# Patient Record
Sex: MALE | Race: BLACK OR AFRICAN AMERICAN | NOT HISPANIC OR LATINO
[De-identification: names, ages, dates, MRNs, and addresses within clinical notes are randomized per-mention and may not be internally consistent; named-entity substitution may affect disease eponyms.]

---

## 2017-01-05 ENCOUNTER — APPOINTMENT (OUTPATIENT)
Dept: PULMONOLOGY | Facility: CLINIC | Age: 72
End: 2017-01-05

## 2017-01-05 VITALS
HEIGHT: 66 IN | WEIGHT: 157 LBS | OXYGEN SATURATION: 99 % | HEART RATE: 68 BPM | SYSTOLIC BLOOD PRESSURE: 140 MMHG | DIASTOLIC BLOOD PRESSURE: 90 MMHG | BODY MASS INDEX: 25.23 KG/M2

## 2017-01-05 DIAGNOSIS — Z85.51 PERSONAL HISTORY OF MALIGNANT NEOPLASM OF BLADDER: ICD-10-CM

## 2017-01-05 DIAGNOSIS — Z87.891 PERSONAL HISTORY OF NICOTINE DEPENDENCE: ICD-10-CM

## 2017-01-05 PROBLEM — Z00.00 ENCOUNTER FOR PREVENTIVE HEALTH EXAMINATION: Status: ACTIVE | Noted: 2017-01-05

## 2017-01-06 PROBLEM — Z87.891 FORMER SMOKER: Status: ACTIVE | Noted: 2017-01-06

## 2017-01-06 PROBLEM — Z85.51 HISTORY OF MALIGNANT NEOPLASM OF BLADDER: Status: RESOLVED | Noted: 2017-01-06 | Resolved: 2017-01-06

## 2017-01-17 ENCOUNTER — RECORD ABSTRACTING (OUTPATIENT)
Age: 72
End: 2017-01-17

## 2017-01-17 DIAGNOSIS — C67.9 MALIGNANT NEOPLASM OF BLADDER, UNSPECIFIED: ICD-10-CM

## 2017-01-17 DIAGNOSIS — R07.9 CHEST PAIN, UNSPECIFIED: ICD-10-CM

## 2017-01-17 DIAGNOSIS — E11.9 TYPE 2 DIABETES MELLITUS W/OUT COMPLICATIONS: ICD-10-CM

## 2017-01-20 ENCOUNTER — APPOINTMENT (OUTPATIENT)
Dept: PULMONOLOGY | Facility: CLINIC | Age: 72
End: 2017-01-20

## 2017-01-20 VITALS
HEIGHT: 66 IN | SYSTOLIC BLOOD PRESSURE: 150 MMHG | BODY MASS INDEX: 25.71 KG/M2 | WEIGHT: 160 LBS | DIASTOLIC BLOOD PRESSURE: 70 MMHG | HEART RATE: 69 BPM | OXYGEN SATURATION: 100 %

## 2017-02-03 ENCOUNTER — APPOINTMENT (OUTPATIENT)
Dept: PULMONOLOGY | Facility: CLINIC | Age: 72
End: 2017-02-03

## 2017-02-03 DIAGNOSIS — R04.2 HEMOPTYSIS: ICD-10-CM

## 2017-02-17 ENCOUNTER — APPOINTMENT (OUTPATIENT)
Dept: PULMONOLOGY | Facility: CLINIC | Age: 72
End: 2017-02-17

## 2017-02-17 VITALS
HEART RATE: 86 BPM | BODY MASS INDEX: 24.91 KG/M2 | SYSTOLIC BLOOD PRESSURE: 146 MMHG | WEIGHT: 155 LBS | HEIGHT: 66 IN | OXYGEN SATURATION: 99 % | DIASTOLIC BLOOD PRESSURE: 76 MMHG

## 2017-03-17 ENCOUNTER — APPOINTMENT (OUTPATIENT)
Dept: PULMONOLOGY | Facility: CLINIC | Age: 72
End: 2017-03-17

## 2017-03-17 VITALS
TEMPERATURE: 98.5 F | WEIGHT: 156 LBS | OXYGEN SATURATION: 100 % | DIASTOLIC BLOOD PRESSURE: 78 MMHG | HEIGHT: 66 IN | SYSTOLIC BLOOD PRESSURE: 134 MMHG | HEART RATE: 85 BPM | BODY MASS INDEX: 25.07 KG/M2

## 2017-04-28 ENCOUNTER — APPOINTMENT (OUTPATIENT)
Dept: PULMONOLOGY | Facility: CLINIC | Age: 72
End: 2017-04-28

## 2017-04-28 VITALS
DIASTOLIC BLOOD PRESSURE: 78 MMHG | BODY MASS INDEX: 24.59 KG/M2 | WEIGHT: 153 LBS | HEART RATE: 73 BPM | SYSTOLIC BLOOD PRESSURE: 120 MMHG | OXYGEN SATURATION: 100 % | HEIGHT: 66 IN

## 2017-04-28 DIAGNOSIS — R05 COUGH: ICD-10-CM

## 2017-05-31 ENCOUNTER — APPOINTMENT (OUTPATIENT)
Dept: PULMONOLOGY | Facility: CLINIC | Age: 72
End: 2017-05-31

## 2017-05-31 VITALS
SYSTOLIC BLOOD PRESSURE: 126 MMHG | RESPIRATION RATE: 17 BRPM | WEIGHT: 148 LBS | OXYGEN SATURATION: 100 % | DIASTOLIC BLOOD PRESSURE: 76 MMHG | HEIGHT: 66 IN | HEART RATE: 78 BPM | BODY MASS INDEX: 23.78 KG/M2

## 2017-09-08 ENCOUNTER — APPOINTMENT (OUTPATIENT)
Dept: PULMONOLOGY | Facility: CLINIC | Age: 72
End: 2017-09-08
Payer: MEDICARE

## 2017-09-08 VITALS
RESPIRATION RATE: 16 BRPM | HEART RATE: 76 BPM | SYSTOLIC BLOOD PRESSURE: 124 MMHG | TEMPERATURE: 97.4 F | BODY MASS INDEX: 22.98 KG/M2 | WEIGHT: 143 LBS | OXYGEN SATURATION: 100 % | DIASTOLIC BLOOD PRESSURE: 74 MMHG | HEIGHT: 66 IN

## 2017-09-08 PROCEDURE — 94729 DIFFUSING CAPACITY: CPT

## 2017-09-08 PROCEDURE — 94060 EVALUATION OF WHEEZING: CPT

## 2017-09-08 PROCEDURE — 99214 OFFICE O/P EST MOD 30 MIN: CPT | Mod: 25

## 2017-09-08 PROCEDURE — 94727 GAS DIL/WSHOT DETER LNG VOL: CPT

## 2017-12-08 ENCOUNTER — APPOINTMENT (OUTPATIENT)
Dept: PULMONOLOGY | Facility: CLINIC | Age: 72
End: 2017-12-08
Payer: MEDICARE

## 2017-12-08 VITALS
WEIGHT: 141 LBS | OXYGEN SATURATION: 99 % | HEART RATE: 84 BPM | RESPIRATION RATE: 16 BRPM | SYSTOLIC BLOOD PRESSURE: 114 MMHG | DIASTOLIC BLOOD PRESSURE: 72 MMHG | BODY MASS INDEX: 22.76 KG/M2 | TEMPERATURE: 98.4 F

## 2017-12-08 PROCEDURE — 99214 OFFICE O/P EST MOD 30 MIN: CPT

## 2017-12-08 RX ORDER — AMLODIPINE BESYLATE 2.5 MG/1
2.5 TABLET ORAL
Refills: 0 | Status: DISCONTINUED | COMMUNITY
End: 2017-12-08

## 2017-12-08 RX ORDER — METOPROLOL TARTRATE 100 MG/1
100 TABLET, FILM COATED ORAL
Refills: 0 | Status: DISCONTINUED | COMMUNITY
End: 2017-12-08

## 2017-12-08 RX ORDER — METOPROLOL SUCCINATE 50 MG/1
50 TABLET, EXTENDED RELEASE ORAL
Refills: 0 | Status: ACTIVE | COMMUNITY

## 2018-03-09 ENCOUNTER — APPOINTMENT (OUTPATIENT)
Dept: PULMONOLOGY | Facility: CLINIC | Age: 73
End: 2018-03-09
Payer: MEDICARE

## 2018-03-09 VITALS
DIASTOLIC BLOOD PRESSURE: 74 MMHG | OXYGEN SATURATION: 98 % | BODY MASS INDEX: 23.4 KG/M2 | SYSTOLIC BLOOD PRESSURE: 130 MMHG | HEART RATE: 71 BPM | WEIGHT: 145 LBS

## 2018-03-09 PROCEDURE — 99214 OFFICE O/P EST MOD 30 MIN: CPT

## 2018-03-09 RX ORDER — MESALAMINE 1.2 G/1
1.2 TABLET, DELAYED RELEASE ORAL
Refills: 0 | Status: COMPLETED | COMMUNITY
End: 2018-03-09

## 2018-09-07 ENCOUNTER — APPOINTMENT (OUTPATIENT)
Dept: PULMONOLOGY | Facility: CLINIC | Age: 73
End: 2018-09-07
Payer: MEDICARE

## 2018-09-07 VITALS
TEMPERATURE: 98.1 F | BODY MASS INDEX: 23.24 KG/M2 | HEART RATE: 59 BPM | WEIGHT: 144 LBS | RESPIRATION RATE: 12 BRPM | SYSTOLIC BLOOD PRESSURE: 120 MMHG | OXYGEN SATURATION: 100 % | DIASTOLIC BLOOD PRESSURE: 80 MMHG

## 2018-09-07 PROCEDURE — 99214 OFFICE O/P EST MOD 30 MIN: CPT

## 2018-09-07 RX ORDER — ATORVASTATIN CALCIUM 20 MG/1
20 TABLET, FILM COATED ORAL
Qty: 90 | Refills: 0 | Status: DISCONTINUED | COMMUNITY
Start: 2018-01-17 | End: 2018-09-07

## 2018-09-07 RX ORDER — MESALAMINE 1.2 G/1
1.2 TABLET, DELAYED RELEASE ORAL
Refills: 0 | Status: ACTIVE | COMMUNITY

## 2018-09-07 RX ORDER — ATORVASTATIN CALCIUM 10 MG/1
10 TABLET, FILM COATED ORAL
Refills: 0 | Status: DISCONTINUED | COMMUNITY
End: 2018-09-07

## 2019-03-08 ENCOUNTER — APPOINTMENT (OUTPATIENT)
Dept: PULMONOLOGY | Facility: CLINIC | Age: 74
End: 2019-03-08
Payer: MEDICARE

## 2019-03-08 VITALS
SYSTOLIC BLOOD PRESSURE: 108 MMHG | TEMPERATURE: 97.6 F | BODY MASS INDEX: 23.57 KG/M2 | WEIGHT: 146 LBS | HEART RATE: 67 BPM | DIASTOLIC BLOOD PRESSURE: 72 MMHG | RESPIRATION RATE: 12 BRPM | OXYGEN SATURATION: 100 %

## 2019-03-08 PROCEDURE — 94060 EVALUATION OF WHEEZING: CPT

## 2019-03-08 PROCEDURE — 94727 GAS DIL/WSHOT DETER LNG VOL: CPT

## 2019-03-08 PROCEDURE — 94729 DIFFUSING CAPACITY: CPT

## 2019-03-08 PROCEDURE — 99215 OFFICE O/P EST HI 40 MIN: CPT | Mod: 25

## 2019-03-08 RX ORDER — HYDROCORTISONE 2.5% 25 MG/G
2.5 CREAM TOPICAL
Qty: 30 | Refills: 0 | Status: COMPLETED | COMMUNITY
Start: 2017-06-28 | End: 2019-03-08

## 2019-03-08 RX ORDER — ROSUVASTATIN CALCIUM 20 MG/1
20 TABLET, FILM COATED ORAL
Refills: 0 | Status: COMPLETED | COMMUNITY
End: 2019-03-08

## 2019-03-08 NOTE — DISCUSSION/SUMMARY
[FreeTextEntry1] : Darrell Bean is a 73 year-old man with history of bladder cancer and ileostomy. He is S/P a VATS with a right upper lobectomy in April 2017 for a 3.0 cm right upper lobe cavitary mass. 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 Lymph nodes negative. There was a suspicion that the right upper lobe mass was a metastatic bladder cancer. He was seen by medical oncology. Chemotherapy was advised.  \par \par Thus far there is no evidence of any recurrent disease as noted on his multiple CT scans of the chest. No further investigation is needed at this time. He is to follow up with thoracic surgery and medical oncology. \par \par I will see him again in 6 months. Sooner if needed.

## 2019-03-08 NOTE — PHYSICAL EXAM
[General Appearance - Well Developed] : well developed [Well Groomed] : well groomed [General Appearance - In No Acute Distress] : no acute distress [Neck Cervical Mass (___cm)] : no neck mass was observed [Heart Sounds] : normal S1 and S2 [Respiration, Rhythm And Depth] : normal respiratory rhythm and effort [Auscultation Breath Sounds / Voice Sounds] : lungs were clear to auscultation bilaterally [Surgical scars] : surgical scars [Abdomen Soft] : soft [Abdomen Tenderness] : non-tender [Abnormal Walk] : normal gait [Cyanosis, Localized] : no localized cyanosis [Skin Turgor] : normal skin turgor [] : no rash [No Focal Deficits] : no focal deficits [Oriented To Time, Place, And Person] : oriented to person, place, and time [Impaired Insight] : insight and judgment were intact [Erythema] : no erythema of the pharynx [FreeTextEntry1] : A urine collection bag noted on the right lower abdominal wall.

## 2019-03-08 NOTE — HISTORY OF PRESENT ILLNESS
[FreeTextEntry1] : He is feeling well. Denies any cough, wheezing shortness of breath. No chest pain or pressure. No palpitations. His appetite is normal. He is not having any constitutional symptoms. He remains active.Has not smoked since 1990.

## 2019-03-08 NOTE — REVIEW OF SYSTEMS
[Nasal Congestion] : nasal congestion [Hypertension] : ~T hypertension [Fever] : no fever [Chills] : no chills [Fatigue] : no fatigue [Poor Appetite] : normal appetite  [Epistaxis] : no nosebleeds [Postnasal Drip] : no postnasal drip [Sinus Problems] : no sinus problems [Cough] : no cough [Sputum] : not coughing up ~M sputum [Hemoptysis] : no hemoptysis [Dyspnea] : no dyspnea [Chest Tightness] : no chest tightness [Pleuritic Pain] : no pleuritic pain [Wheezing] : no wheezing [Chest Discomfort] : no chest discomfort [PND] : no PND [Palpitations] : no palpitations [Edema] : ~T edema was not present [Hives] : no urticaria was observed [Heartburn] : no heartburn [Reflux] : no reflux [Dysphagia] : no dysphagia [Nausea] : no nausea [Nocturia] : no nocturia [Myalgias] : no myalgias [Rash] : no [unfilled] rash [Headache] : no headache [Anxiety] : no anxiety [DVT] : no DVT [Pulmonary Embolism] : no pulmonary embolism [Snoring] : no snoring

## 2019-03-08 NOTE — PROCEDURE
[FreeTextEntry1] : Pulmonary function test was performed on 9/8/17.  Spirometry, lung volumes and diffusion were all normal.\par \par Pulmonary function test March 8, 2019: Spirometry was normal. Lung volumes are normal. Diffusion capacity was normal. Minimal response noted after administration of an inhaled bronchodilator.\par \par CT examination of the chest was performed on November 9, 2018 and compared to 5/18/18. A small nodule was noted in the left upper lobe, measuring 2 mm and unchanged when compared to the prior study. A ground glass nodular opacity also present in the left upper lobe measuring 5 mm. Also unchanged from the prior CT. No new nodules noted. No lymphadenopathy. No pleural effusions.

## 2019-09-11 ENCOUNTER — APPOINTMENT (OUTPATIENT)
Dept: PULMONOLOGY | Facility: CLINIC | Age: 74
End: 2019-09-11
Payer: MEDICARE

## 2019-09-11 VITALS
SYSTOLIC BLOOD PRESSURE: 120 MMHG | OXYGEN SATURATION: 100 % | HEART RATE: 61 BPM | TEMPERATURE: 97.8 F | BODY MASS INDEX: 22.27 KG/M2 | DIASTOLIC BLOOD PRESSURE: 74 MMHG | RESPIRATION RATE: 12 BRPM | WEIGHT: 138 LBS

## 2019-09-11 PROCEDURE — 99214 OFFICE O/P EST MOD 30 MIN: CPT

## 2019-09-11 NOTE — PHYSICAL EXAM
[Well Groomed] : well groomed [General Appearance - In No Acute Distress] : no acute distress [Neck Cervical Mass (___cm)] : no neck mass was observed [Heart Sounds] : normal S1 and S2 [Auscultation Breath Sounds / Voice Sounds] : lungs were clear to auscultation bilaterally [Surgical scars] : surgical scars [Abdomen Tenderness] : non-tender [Abnormal Walk] : normal gait [Cyanosis, Localized] : no localized cyanosis [Skin Turgor] : normal skin turgor [] : no rash [No Focal Deficits] : no focal deficits [Oriented To Time, Place, And Person] : oriented to person, place, and time [Impaired Insight] : insight and judgment were intact [Erythema] : no erythema of the pharynx [FreeTextEntry1] : A urine collection bag noted on the right lower abdominal wall.

## 2019-09-11 NOTE — HISTORY OF PRESENT ILLNESS
[FreeTextEntry1] : He is feeling well. Denied any cough, wheezing shortness of breath. No chest pain or pressure. No palpitations. His appetite is normal. He is not having any constitutional symptoms. He remains active.Has not smoked since 1990. Had CT of the Chest on 8/22/19.

## 2019-09-11 NOTE — REVIEW OF SYSTEMS
[Nasal Congestion] : nasal congestion [Hypertension] : ~T hypertension [Fatigue] : no fatigue [Poor Appetite] : normal appetite  [Epistaxis] : no nosebleeds [Postnasal Drip] : no postnasal drip [Cough] : no cough [Sinus Problems] : no sinus problems [Sputum] : not coughing up ~M sputum [Dyspnea] : no dyspnea [Hemoptysis] : no hemoptysis [Chest Discomfort] : no chest discomfort [PND] : no PND [Palpitations] : no palpitations [Edema] : ~T edema was not present [Hives] : no urticaria was observed [Heartburn] : no heartburn [Reflux] : no reflux [Dysphagia] : no dysphagia [Nausea] : no nausea [Nocturia] : no nocturia [Myalgias] : no myalgias [Rash] : no [unfilled] rash [Headache] : no headache [DVT] : no DVT [Anxiety] : no anxiety [Pulmonary Embolism] : no pulmonary embolism [Snoring] : no snoring

## 2019-09-11 NOTE — DISCUSSION/SUMMARY
[FreeTextEntry1] : Darrell Bean is a 74 year-old man with history of bladder cancer and ileostomy. He is S/P a VATS with a right upper lobectomy in April 2017 for a 3.0 cm right upper lobe cavitary mass. 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 Lymph nodes negative. There was a suspicion that the right upper lobe mass was a metastatic bladder cancer. He was seen by medical oncology. Chemotherapy was advised.  \par \par Based on CT of 8/22/19 there is no evidence of any recurrent disease. No further investigation is needed at this time. He is to follow up with thoracic surgery.\par \par I will see him again in 6 months. Sooner if needed.

## 2019-09-11 NOTE — PROCEDURE
[FreeTextEntry1] : Pulmonary function test was performed on 9/8/17.  Spirometry, lung volumes and diffusion were all normal.\par \par Pulmonary function test March 8, 2019: Spirometry was normal. Lung volumes are normal. Diffusion capacity was normal. Minimal response noted after administration of an inhaled bronchodilator.\par \par CT Chest 11/9/18: Compared to 5/18/18.  A small nodule was noted in the left upper lobe, measuring 2 mm and unchanged when compared to the prior study.  A ground glass nodular opacity also present in the left upper lobe measuring 5 mm. Also unchanged from the prior CT. No new nodules noted. No lymphadenopathy. No pleural effusions.\par \par CT Chest 8/22/19: Post-op changed in the right lung. 5 mm ground glass nodule in the left upper lobe unchanged compared to prior study.

## 2020-03-11 ENCOUNTER — APPOINTMENT (OUTPATIENT)
Dept: PULMONOLOGY | Facility: CLINIC | Age: 75
End: 2020-03-11
Payer: MEDICARE

## 2020-03-11 VITALS
TEMPERATURE: 98.1 F | HEART RATE: 76 BPM | DIASTOLIC BLOOD PRESSURE: 84 MMHG | OXYGEN SATURATION: 98 % | WEIGHT: 141 LBS | BODY MASS INDEX: 22.76 KG/M2 | SYSTOLIC BLOOD PRESSURE: 138 MMHG

## 2020-03-11 PROCEDURE — 99214 OFFICE O/P EST MOD 30 MIN: CPT

## 2020-03-11 NOTE — PHYSICAL EXAM
[General Appearance - In No Acute Distress] : no acute distress [Neck Cervical Mass (___cm)] : no neck mass was observed [Heart Sounds] : normal S1 and S2 [Auscultation Breath Sounds / Voice Sounds] : lungs were clear to auscultation bilaterally [Surgical scars] : surgical scars [Abdomen Tenderness] : non-tender [Abnormal Walk] : normal gait [Cyanosis, Localized] : no localized cyanosis [] : no rash [No Focal Deficits] : no focal deficits [Oriented To Time, Place, And Person] : oriented to person, place, and time [Impaired Insight] : insight and judgment were intact [Erythema] : no erythema of the pharynx [FreeTextEntry1] : A urine collection bag noted on the right lower abdominal wall.

## 2020-03-11 NOTE — REVIEW OF SYSTEMS
[Nasal Congestion] : nasal congestion [Hypertension] : ~T hypertension [Fever] : no fever [Fatigue] : no fatigue [Poor Appetite] : normal appetite  [Epistaxis] : no nosebleeds [Postnasal Drip] : no postnasal drip [Sinus Problems] : no sinus problems [Cough] : no cough [Sputum] : not coughing up ~M sputum [Hemoptysis] : no hemoptysis [Dyspnea] : no dyspnea [Chest Discomfort] : no chest discomfort [PND] : no PND [Palpitations] : no palpitations [Edema] : ~T edema was not present [Hives] : no urticaria was observed [Heartburn] : no heartburn [Reflux] : no reflux [Dysphagia] : no dysphagia [Nausea] : no nausea [Nocturia] : no nocturia [Back Pain] : ~T no back pain [Myalgias] : no myalgias [Rash] : no [unfilled] rash [Headache] : no headache [Anxiety] : no anxiety [DVT] : no DVT [Pulmonary Embolism] : no pulmonary embolism [Snoring] : no snoring

## 2020-03-11 NOTE — PROCEDURE
[FreeTextEntry1] : PFT 9/8/17:  Spirometry, lung volumes and diffusion were all normal.\par \par PFT  3/819: Spirometry was normal. Lung volumes are normal. Diffusion capacity was normal. Minimal response noted after administration of an inhaled bronchodilator.\par \par CT Chest 11/9/18: Compared to 5/18/18.  A small nodule was noted in the left upper lobe, measuring 2 mm and unchanged when compared to the prior study.  A ground glass nodular opacity also present in the left upper lobe measuring 5 mm. Also unchanged from the prior CT. No new nodules noted. No lymphadenopathy. No pleural effusions.\par \par CT Chest 8/22/19: Post-op changed in the right lung. 5 mm ground glass nodule in the left upper lobe unchanged compared to prior study.

## 2020-03-11 NOTE — HISTORY OF PRESENT ILLNESS
[FreeTextEntry1] : He is feeling well. No cough, wheezing shortness of breath. No chest pain or pressure. He is not using any inhalers. No palpitations. His appetite is normal. He is not having any constitutional symptoms. He remains active.Has not smoked since 1990.

## 2020-03-11 NOTE — DISCUSSION/SUMMARY
[FreeTextEntry1] : Darrell Bean is a 74 year-old man with history of bladder cancer and ileostomy. He is S/P a VATS with a right upper lobectomy in April 2017 for a 3.0 cm right upper lobe cavitary mass. 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 Lymph nodes were negative. There was a suspicion that the right upper lobe mass was a metastatic bladder cancer. He was seen by medical oncology. CT of 8/22/19 showed no evidence of any recurrent disease. Follow up CT in May 2020. \par \par No action needed now. \par \par I will see him again in 6 months. Sooner if needed.

## 2020-09-16 ENCOUNTER — APPOINTMENT (OUTPATIENT)
Dept: PULMONOLOGY | Facility: CLINIC | Age: 75
End: 2020-09-16
Payer: MEDICARE

## 2020-09-16 VITALS
OXYGEN SATURATION: 100 % | BODY MASS INDEX: 22.27 KG/M2 | SYSTOLIC BLOOD PRESSURE: 140 MMHG | TEMPERATURE: 98.4 F | DIASTOLIC BLOOD PRESSURE: 80 MMHG | HEART RATE: 65 BPM | WEIGHT: 138 LBS

## 2020-09-16 PROCEDURE — 99214 OFFICE O/P EST MOD 30 MIN: CPT

## 2020-09-16 NOTE — REVIEW OF SYSTEMS
[Nasal Congestion] : nasal congestion [Hypertension] : ~T hypertension [Fever] : no fever [Fatigue] : no fatigue [Epistaxis] : no nosebleeds [Poor Appetite] : normal appetite  [Sinus Problems] : no sinus problems [Postnasal Drip] : no postnasal drip [Hemoptysis] : no hemoptysis [Cough] : no cough [Sputum] : not coughing up ~M sputum [Chest Discomfort] : no chest discomfort [Dyspnea] : no dyspnea [Palpitations] : no palpitations [Edema] : ~T edema was not present [PND] : no PND [Hives] : no urticaria was observed [Heartburn] : no heartburn [Dysphagia] : no dysphagia [Reflux] : no reflux [Nocturia] : no nocturia [Nausea] : no nausea [Rash] : no [unfilled] rash [Myalgias] : no myalgias [Back Pain] : ~T no back pain [Anxiety] : no anxiety [Headache] : no headache [DVT] : no DVT [Pulmonary Embolism] : no pulmonary embolism [Snoring] : no snoring

## 2020-09-16 NOTE — HISTORY OF PRESENT ILLNESS
[Former] : former [TextBox_4] : He is feeling well. No cough, wheezing shortness of breath. No chest pain or pressure. He is not using any inhalers. No palpitations. His appetite is normal. He is not having any constitutional symptoms. He remains active.Has not smoked since 1990. [YearQuit] : 1990

## 2020-09-16 NOTE — DISCUSSION/SUMMARY
[FreeTextEntry1] : Darrell Bean is a 75 year-old man with history of bladder cancer and ileostomy. He is S/P a VATS with a right upper lobectomy in April 2017 for a 3.0 cm right upper lobe cavitary mass. 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 Lymph nodes were negative. There was a suspicion that the right upper lobe mass was a metastatic bladder cancer. He was seen by medical oncology. CT of 8/22/19 showed no evidence of any recurrent disease. Follow up CT in May 2020. \par He is doing well. \par \par Follow up CT of the Chest now. \par \par Follow up in six months. Sooner if necessary.

## 2020-09-16 NOTE — PHYSICAL EXAM
[General Appearance - In No Acute Distress] : no acute distress [Heart Sounds] : normal S1 and S2 [Neck Cervical Mass (___cm)] : no neck mass was observed [Auscultation Breath Sounds / Voice Sounds] : lungs were clear to auscultation bilaterally [Surgical scars] : surgical scars [Abdomen Tenderness] : non-tender [Abnormal Walk] : normal gait [Cyanosis, Localized] : no localized cyanosis [] : no rash [Oriented To Time, Place, And Person] : oriented to person, place, and time [No Focal Deficits] : no focal deficits [Impaired Insight] : insight and judgment were intact [Erythema] : no erythema of the pharynx [FreeTextEntry1] : A urine collection bag noted on the right lower abdominal wall.

## 2021-03-17 ENCOUNTER — APPOINTMENT (OUTPATIENT)
Dept: PULMONOLOGY | Facility: CLINIC | Age: 76
End: 2021-03-17
Payer: MEDICARE

## 2021-03-17 VITALS
SYSTOLIC BLOOD PRESSURE: 144 MMHG | TEMPERATURE: 97 F | OXYGEN SATURATION: 99 % | BODY MASS INDEX: 23.89 KG/M2 | WEIGHT: 148 LBS | HEART RATE: 70 BPM | RESPIRATION RATE: 16 BRPM | DIASTOLIC BLOOD PRESSURE: 80 MMHG

## 2021-03-17 PROCEDURE — 99213 OFFICE O/P EST LOW 20 MIN: CPT

## 2021-03-17 PROCEDURE — 99072 ADDL SUPL MATRL&STAF TM PHE: CPT

## 2021-03-17 NOTE — DISCUSSION/SUMMARY
[FreeTextEntry1] : He is a 75 year-old man with history of bladder cancer and ileostomy. He is S/P a VATS with a right upper lobectomy in April 2017 for a 3.0 cm right upper lobe cavitary mass. 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 Lymph nodes were negative. There was a suspicion that the right upper lobe mass was a metastatic bladder cancer. He was seen by medical oncology. No chemotherapy was indicated. CT of 8/22/19 showed no evidence of any recurrent disease. CT Chest 10/2/20: Postoperative changes in the right lung. 5 mm JOHNY nodule was stable. \par \par His pulmonary status is stable. No further action needed now.  \par \par Follow up with his PCP, Dr. Tate, next week. \par \par Follow up in 6 months.

## 2021-03-17 NOTE — REASON FOR VISIT
[Follow-Up] : a follow-up visit [Abnormal CT Scan] : abnormal CT Scan [Lung Cancer] : lung cancer none

## 2021-03-17 NOTE — REVIEW OF SYSTEMS
[Nasal Congestion] : nasal congestion [Hypertension] : ~T hypertension [Fever] : no fever [Fatigue] : no fatigue [Poor Appetite] : normal appetite  [Cough] : no cough [Hemoptysis] : no hemoptysis [Dyspnea] : no dyspnea [Chest Discomfort] : no chest discomfort [PND] : no PND [Palpitations] : no palpitations [Edema] : ~T edema was not present [Hives] : no urticaria was observed [Heartburn] : no heartburn [Reflux] : no reflux [Dysphagia] : no dysphagia [Nausea] : no nausea [Nocturia] : no nocturia [Back Pain] : ~T no back pain [Myalgias] : no myalgias [Rash] : no [unfilled] rash [Headache] : no headache [Anxiety] : no anxiety [DVT] : no DVT [Pulmonary Embolism] : no pulmonary embolism [Snoring] : no snoring

## 2021-03-17 NOTE — PHYSICAL EXAM
[General Appearance - In No Acute Distress] : no acute distress [Neck Cervical Mass (___cm)] : no neck mass was observed [Heart Sounds] : normal S1 and S2 [Auscultation Breath Sounds / Voice Sounds] : lungs were clear to auscultation bilaterally [Surgical scars] : surgical scars [Abdomen Tenderness] : non-tender [Abnormal Walk] : normal gait [Cyanosis, Localized] : no localized cyanosis [No Focal Deficits] : no focal deficits [Oriented To Time, Place, And Person] : oriented to person, place, and time [Impaired Insight] : insight and judgment were intact [] : no respiratory distress [FreeTextEntry1] : A urine collection bag noted on the right lower abdominal wall.

## 2021-03-17 NOTE — PROCEDURE
[FreeTextEntry1] : PFT 9/8/17:  Spirometry, lung volumes and diffusion were all normal.\par \par PFT  3/819: Spirometry was normal. Lung volumes are normal. Diffusion capacity was normal. Minimal response noted after administration of an inhaled bronchodilator.\par \par CT Chest 11/9/18: Compared to 5/18/18.  A small nodule was noted in the left upper lobe, measuring 2 mm and unchanged when compared to the prior study.  A ground glass nodular opacity also present in the left upper lobe measuring 5 mm. Also unchanged from the prior CT. No new nodules noted. No lymphadenopathy. No pleural effusions.\par \par CT Chest 8/22/19: Post-op changed in the right lung. 5 mm ground glass nodule in the left upper lobe unchanged compared to prior study. \par \par CT Chest 10/2/20: Postoperative changes in the right lung. 5 mm JOHNY nodule was stable.

## 2021-03-17 NOTE — HISTORY OF PRESENT ILLNESS
[Former] : former [TextBox_4] : He is feeling well. No cough, wheezing shortness of breath. No chest pain or pressure. \par \par He is not using any inhalers.\par \par He remains active.Has not smoked since 1990. [YearQuit] : 1990

## 2021-09-15 ENCOUNTER — APPOINTMENT (OUTPATIENT)
Dept: PULMONOLOGY | Facility: CLINIC | Age: 76
End: 2021-09-15

## 2021-10-04 ENCOUNTER — APPOINTMENT (OUTPATIENT)
Dept: PULMONOLOGY | Facility: CLINIC | Age: 76
End: 2021-10-04
Payer: MEDICARE

## 2021-10-04 VITALS
HEART RATE: 64 BPM | WEIGHT: 138 LBS | DIASTOLIC BLOOD PRESSURE: 88 MMHG | BODY MASS INDEX: 22.27 KG/M2 | SYSTOLIC BLOOD PRESSURE: 144 MMHG | TEMPERATURE: 98.4 F | OXYGEN SATURATION: 99 %

## 2021-10-04 PROCEDURE — 99213 OFFICE O/P EST LOW 20 MIN: CPT

## 2021-10-04 NOTE — REVIEW OF SYSTEMS
[Nasal Congestion] : nasal congestion [Hypertension] : ~T hypertension [Fatigue] : no fatigue [Cough] : no cough [Dyspnea] : no dyspnea [Chest Discomfort] : no chest discomfort [PND] : no PND [Palpitations] : no palpitations [Edema] : ~T edema was not present [Hives] : no urticaria was observed [Heartburn] : no heartburn [Reflux] : no reflux [Dysphagia] : no dysphagia [Nausea] : no nausea [Nocturia] : no nocturia [Back Pain] : ~T no back pain [Myalgias] : no myalgias [Rash] : no [unfilled] rash [Headache] : no headache [Anxiety] : no anxiety [DVT] : no DVT [Pulmonary Embolism] : no pulmonary embolism

## 2021-10-04 NOTE — PHYSICAL EXAM
[General Appearance - In No Acute Distress] : no acute distress [Neck Cervical Mass (___cm)] : no neck mass was observed [Heart Sounds] : normal S1 and S2 [Auscultation Breath Sounds / Voice Sounds] : lungs were clear to auscultation bilaterally [Surgical scars] : surgical scars [Abdomen Tenderness] : non-tender [Abnormal Walk] : normal gait [Cyanosis, Localized] : no localized cyanosis [No Focal Deficits] : no focal deficits [Oriented To Time, Place, And Person] : oriented to person, place, and time [Impaired Insight] : insight and judgment were intact [Skin Turgor] : normal skin turgor [FreeTextEntry1] : A urine collection bag noted on the right lower abdominal wall.

## 2021-10-04 NOTE — HISTORY OF PRESENT ILLNESS
[Former] : former [TextBox_4] : He is feeling well. No cough, wheezing shortness of breath.  \par \par He is not using any inhalers.\par \par Has not smoked since 1990. [YearQuit] : 1990 [Daytime Somnolence] : denies daytime somnolence [Difficulty Initiating Sleep] : does not have difficulty initiating sleep [Difficulty Maintaining Sleep] : does not have difficulty maintaining sleep

## 2021-10-04 NOTE — PROCEDURE
[FreeTextEntry1] : PFT 9/8/17:  Spirometry, lung volumes and diffusion were all normal.\par \par PFT  3/819: Spirometry was normal. Lung volumes are normal. Diffusion capacity was normal. Minimal response noted after administration of an inhaled bronchodilator.\par \par CT Chest 11/9/18: Compared to 5/18/18.  A small nodule was noted in the left upper lobe, measuring 2 mm and unchanged when compared to the prior study.  A ground glass nodular opacity also present in the left upper lobe measuring 5 mm. Also unchanged from the prior CT. No new nodules noted. No lymphadenopathy. No pleural effusions.\par \par CT Chest 8/22/19: Post-op changed in the right lung. 5 mm ground glass nodule in the left upper lobe unchanged compared to prior study. \par \par CT Chest 10/2/20: Postoperative changes in the right lung. 5 mm JOHNY nodule was stable. \par \par CT Chest 2/9/21: Stable post-op changes. 5 mm nodule JOHNY stable.No recurrent disease.

## 2021-10-04 NOTE — DISCUSSION/SUMMARY
[FreeTextEntry1] : 76 year-old man with history of bladder cancer and ileostomy. S/P VATS with a RUL lobectomy April 2017 for 3.0 cm right upper lobe cavitary mass. Pathology significant for carcinoma with squamous cell features. Lymph nodes negative. There was a suspicion that the right upper lobe mass was a metastatic bladder cancer. Seen by medical oncology. No chemotherapy was indicated. CT of 8/22/19 showed no evidence of any recurrent disease. CT Chest 10/2/20: Postoperative changes in the right lung. 5 mm JOHNY nodule was stable. CT Chest 2/9/21: Stable post-op changes. 5 mm nodule JOHNY stable.No recurrent disease. \par \par His pulmonary status remains stable. No further action needed now.  \par \par Follow up in 6 months. Sooner if necessary.

## 2022-04-06 ENCOUNTER — APPOINTMENT (OUTPATIENT)
Dept: PULMONOLOGY | Facility: CLINIC | Age: 77
End: 2022-04-06
Payer: MEDICARE

## 2022-04-06 VITALS
OXYGEN SATURATION: 98 % | DIASTOLIC BLOOD PRESSURE: 78 MMHG | TEMPERATURE: 97.7 F | SYSTOLIC BLOOD PRESSURE: 138 MMHG | HEART RATE: 66 BPM

## 2022-04-06 PROCEDURE — 99214 OFFICE O/P EST MOD 30 MIN: CPT

## 2022-04-06 NOTE — HISTORY OF PRESENT ILLNESS
[Former] : former [TextBox_4] : He is feeling well. Has been seen by Urology. \par \par No complaint of cough, wheezing or shortness of breath. \par \par Has not followed up with Dr. Henley. \par \par \par \par  [YearQuit] : 1990 [Daytime Somnolence] : denies daytime somnolence [Difficulty Maintaining Sleep] : does not have difficulty maintaining sleep [Fatigue] : no fatigue

## 2022-04-06 NOTE — DISCUSSION/SUMMARY
[FreeTextEntry1] : He is a 76 year-old man with history of hypertension, hyperlipidemia, bladder cancer and ileostomy. S/P VATS RUL lobectomy April 2017 for 3.0 cm right upper lobe cavitary mass. 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 Lymph nodes negative. There was a suspicion that the right upper lobe mass was a metastatic bladder cancer. Seen by medical oncology. No chemotherapy was indicated. CT 8/22/19 showed no evidence of any recurrent disease. CT Chest 10/2/20: Postoperative changes in the right lung. 5 mm JOHNY nodule was stable. CT Chest 2/9/21: Stable post-op changes. 5 mm nodule JOHNY stable.No recurrent disease. \par \par He is 5 years post op RUL lobectomy.  Last CT done in 2021. Await follow up Chest CT (has been ordered by another MD). Will re-order if necessary. To have report sent to me. \par \par Follow up in 6 months. Sooner if necessary.

## 2022-04-06 NOTE — REVIEW OF SYSTEMS
[Nasal Congestion] : nasal congestion [Hypertension] : ~T hypertension [Fever] : no fever [Fatigue] : no fatigue [Cough] : no cough [Dyspnea] : no dyspnea [Chest Discomfort] : no chest discomfort [PND] : no PND [Palpitations] : no palpitations [Edema] : ~T edema was not present [Hives] : no urticaria was observed [Heartburn] : no heartburn [Reflux] : no reflux [Dysphagia] : no dysphagia [Nausea] : no nausea [Nocturia] : no nocturia [Back Pain] : ~T no back pain [Myalgias] : no myalgias [Rash] : no [unfilled] rash [Headache] : no headache [Anxiety] : no anxiety [DVT] : no DVT [Pulmonary Embolism] : no pulmonary embolism

## 2022-04-06 NOTE — PHYSICAL EXAM
[General Appearance - In No Acute Distress] : no acute distress [Neck Cervical Mass (___cm)] : no neck mass was observed [Heart Sounds] : normal S1 and S2 [Auscultation Breath Sounds / Voice Sounds] : lungs were clear to auscultation bilaterally [Surgical scars] : surgical scars [Abnormal Walk] : normal gait [Cyanosis, Localized] : no localized cyanosis [Skin Turgor] : normal skin turgor [No Focal Deficits] : no focal deficits [Oriented To Time, Place, And Person] : oriented to person, place, and time [Neck Appearance] : the appearance of the neck was normal [] : no hepato-splenomegaly [FreeTextEntry1] : A urine collection bag noted on the right lower abdominal wall.

## 2022-10-12 ENCOUNTER — APPOINTMENT (OUTPATIENT)
Dept: PULMONOLOGY | Facility: CLINIC | Age: 77
End: 2022-10-12

## 2022-10-12 VITALS — DIASTOLIC BLOOD PRESSURE: 76 MMHG | SYSTOLIC BLOOD PRESSURE: 138 MMHG

## 2022-10-12 VITALS
DIASTOLIC BLOOD PRESSURE: 74 MMHG | SYSTOLIC BLOOD PRESSURE: 150 MMHG | TEMPERATURE: 98.4 F | OXYGEN SATURATION: 99 % | HEART RATE: 70 BPM | WEIGHT: 136 LBS | BODY MASS INDEX: 21.95 KG/M2

## 2022-10-12 DIAGNOSIS — J98.4 OTHER DISORDERS OF LUNG: ICD-10-CM

## 2022-10-12 PROCEDURE — 99214 OFFICE O/P EST MOD 30 MIN: CPT

## 2022-10-12 RX ORDER — ASPIRIN 81 MG/1
81 TABLET, CHEWABLE ORAL
Refills: 0 | Status: COMPLETED | COMMUNITY
End: 2022-10-12

## 2022-10-12 NOTE — PHYSICAL EXAM
[General Appearance - In No Acute Distress] : no acute distress [Neck Appearance] : the appearance of the neck was normal [Neck Cervical Mass (___cm)] : no neck mass was observed [Heart Sounds] : normal S1 and S2 [Auscultation Breath Sounds / Voice Sounds] : lungs were clear to auscultation bilaterally [Surgical scars] : surgical scars [] : no hepato-splenomegaly [Abnormal Walk] : normal gait [Cyanosis, Localized] : no localized cyanosis [Skin Turgor] : normal skin turgor [No Focal Deficits] : no focal deficits [Oriented To Time, Place, And Person] : oriented to person, place, and time [FreeTextEntry1] : A urine collection bag noted on the right lower abdominal wall.

## 2022-10-12 NOTE — DISCUSSION/SUMMARY
[FreeTextEntry1] : He is a 77 year-old man with history of hypertension, hyperlipidemia, bladder cancer and ileostomy. S/P VATS RUL lobectomy 4/17/17 for 3.0 cm right upper lobe cavitary mass. 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 Lymph nodes negative. There was a suspicion that the right upper lobe mass was a metastatic bladder cancer. Seen by medical oncology. No chemotherapy was indicated. CT 8/22/19 showed no evidence of any recurrent disease. CT Chest 10/2/20: Postoperative changes in the right lung. 5 mm JOHNY nodule was stable. CT Chest 2/9/21: Stable post-op changes. 5 mm nodule JONHY stable.No recurrent disease. CT Chest 7/14/22: No recurrent disease. \par \par His pulmonary status is stable.  No further intervention is needed at this time.  He was advised to follow up with his PCP, Dr. Timothy Tate. \par \par Follow up in 6 months.

## 2022-10-12 NOTE — PROCEDURE
[FreeTextEntry1] : PFT 9/8/17: Spirometry, lung volumes and diffusion were all normal.\par \par PFT 3/819: Spirometry was normal. Lung volumes are normal. Diffusion capacity was normal. Minimal response noted after administration of an inhaled bronchodilator.\par \par CT Chest 11/9/18: Compared to 5/18/18.  A small nodule was noted in the left upper lobe, measuring 2 mm and unchanged when compared to the prior study.  A ground glass nodular opacity also present in the left upper lobe measuring 5 mm. Also unchanged from the prior CT. No new nodules noted. No lymphadenopathy. No pleural effusions.\par \par CT Chest 8/22/19: Post-op changed in the right lung. 5 mm ground glass nodule in the left upper lobe unchanged compared to prior study. \par \par CT Chest 10/2/20: Postoperative changes in the right lung. 5 mm JOHNY nodule was stable. \par \par CT Chest 2/9/21: Stable post-op changes. 5 mm nodule JOHNY stable.No recurrent disease. \par \par CT Chest 7/14/22: No recurrent disease.

## 2022-10-12 NOTE — HISTORY OF PRESENT ILLNESS
[Former] : former [TextBox_4] : He is feeling well.  No complaint of cough, wheezing or shortness of breath.  No constitutional symptoms.\par \par \par  [YearQuit] : 1990 [Awakes Unrefreshed] : does not awaken unrefreshed [Daytime Somnolence] : denies daytime somnolence [Difficulty Maintaining Sleep] : does not have difficulty maintaining sleep [Fatigue] : no fatigue

## 2022-10-12 NOTE — REVIEW OF SYSTEMS
[Nasal Congestion] : nasal congestion [Hypertension] : ~T hypertension [Fatigue] : no fatigue [Cough] : no cough [Dyspnea] : no dyspnea [Wheezing] : no wheezing [Chest Discomfort] : no chest discomfort [Edema] : ~T edema was not present [Hives] : no urticaria was observed [Heartburn] : no heartburn [Reflux] : no reflux [Dysphagia] : no dysphagia [Nausea] : no nausea [Nocturia] : no nocturia [Back Pain] : ~T no back pain [Myalgias] : no myalgias [Rash] : no [unfilled] rash [Headache] : no headache [Anxiety] : no anxiety [DVT] : no DVT [Pulmonary Embolism] : no pulmonary embolism

## 2022-12-13 ENCOUNTER — OUTPATIENT (OUTPATIENT)
Dept: OUTPATIENT SERVICES | Facility: HOSPITAL | Age: 77
LOS: 1 days | End: 2022-12-13
Payer: MEDICARE

## 2022-12-13 ENCOUNTER — TRANSCRIPTION ENCOUNTER (OUTPATIENT)
Age: 77
End: 2022-12-13

## 2022-12-13 VITALS
DIASTOLIC BLOOD PRESSURE: 70 MMHG | SYSTOLIC BLOOD PRESSURE: 136 MMHG | RESPIRATION RATE: 16 BRPM | HEART RATE: 58 BPM | OXYGEN SATURATION: 98 %

## 2022-12-13 VITALS
SYSTOLIC BLOOD PRESSURE: 151 MMHG | WEIGHT: 136.03 LBS | OXYGEN SATURATION: 100 % | DIASTOLIC BLOOD PRESSURE: 77 MMHG | HEART RATE: 62 BPM | HEIGHT: 66 IN | TEMPERATURE: 99 F | RESPIRATION RATE: 16 BRPM

## 2022-12-13 DIAGNOSIS — R94.39 ABNORMAL RESULT OF OTHER CARDIOVASCULAR FUNCTION STUDY: ICD-10-CM

## 2022-12-13 LAB
ANION GAP SERPL CALC-SCNC: 12 MMOL/L — SIGNIFICANT CHANGE UP (ref 5–17)
BUN SERPL-MCNC: 12 MG/DL — SIGNIFICANT CHANGE UP (ref 7–23)
CALCIUM SERPL-MCNC: 9.1 MG/DL — SIGNIFICANT CHANGE UP (ref 8.4–10.5)
CHLORIDE SERPL-SCNC: 101 MMOL/L — SIGNIFICANT CHANGE UP (ref 96–108)
CO2 SERPL-SCNC: 26 MMOL/L — SIGNIFICANT CHANGE UP (ref 22–31)
CREAT SERPL-MCNC: 1.1 MG/DL — SIGNIFICANT CHANGE UP (ref 0.5–1.3)
EGFR: 69 ML/MIN/1.73M2 — SIGNIFICANT CHANGE UP
GLUCOSE SERPL-MCNC: 89 MG/DL — SIGNIFICANT CHANGE UP (ref 70–99)
HCT VFR BLD CALC: 39.3 % — SIGNIFICANT CHANGE UP (ref 39–50)
HGB BLD-MCNC: 12.6 G/DL — LOW (ref 13–17)
MCHC RBC-ENTMCNC: 29.2 PG — SIGNIFICANT CHANGE UP (ref 27–34)
MCHC RBC-ENTMCNC: 32.1 GM/DL — SIGNIFICANT CHANGE UP (ref 32–36)
MCV RBC AUTO: 91 FL — SIGNIFICANT CHANGE UP (ref 80–100)
NRBC # BLD: 0 /100 WBCS — SIGNIFICANT CHANGE UP (ref 0–0)
PLATELET # BLD AUTO: 199 K/UL — SIGNIFICANT CHANGE UP (ref 150–400)
POTASSIUM SERPL-MCNC: 3.3 MMOL/L — LOW (ref 3.5–5.3)
POTASSIUM SERPL-SCNC: 3.3 MMOL/L — LOW (ref 3.5–5.3)
RBC # BLD: 4.32 M/UL — SIGNIFICANT CHANGE UP (ref 4.2–5.8)
RBC # FLD: 13.7 % — SIGNIFICANT CHANGE UP (ref 10.3–14.5)
SODIUM SERPL-SCNC: 139 MMOL/L — SIGNIFICANT CHANGE UP (ref 135–145)
WBC # BLD: 3.8 K/UL — SIGNIFICANT CHANGE UP (ref 3.8–10.5)
WBC # FLD AUTO: 3.8 K/UL — SIGNIFICANT CHANGE UP (ref 3.8–10.5)

## 2022-12-13 PROCEDURE — C1725: CPT

## 2022-12-13 PROCEDURE — 93454 CORONARY ARTERY ANGIO S&I: CPT | Mod: 59

## 2022-12-13 PROCEDURE — C1894: CPT

## 2022-12-13 PROCEDURE — 99152 MOD SED SAME PHYS/QHP 5/>YRS: CPT

## 2022-12-13 PROCEDURE — 93005 ELECTROCARDIOGRAM TRACING: CPT

## 2022-12-13 PROCEDURE — C1874: CPT

## 2022-12-13 PROCEDURE — 92928 PRQ TCAT PLMT NTRAC ST 1 LES: CPT | Mod: LD

## 2022-12-13 PROCEDURE — C9600: CPT | Mod: LD

## 2022-12-13 PROCEDURE — 85027 COMPLETE CBC AUTOMATED: CPT

## 2022-12-13 PROCEDURE — 93010 ELECTROCARDIOGRAM REPORT: CPT

## 2022-12-13 PROCEDURE — C1769: CPT

## 2022-12-13 PROCEDURE — 80048 BASIC METABOLIC PNL TOTAL CA: CPT

## 2022-12-13 PROCEDURE — 93454 CORONARY ARTERY ANGIO S&I: CPT | Mod: 26,59

## 2022-12-13 PROCEDURE — C1887: CPT

## 2022-12-13 RX ORDER — CLOPIDOGREL BISULFATE 75 MG/1
1 TABLET, FILM COATED ORAL
Qty: 90 | Refills: 3
Start: 2022-12-13 | End: 2023-12-07

## 2022-12-13 RX ORDER — ASPIRIN/CALCIUM CARB/MAGNESIUM 324 MG
1 TABLET ORAL
Qty: 0 | Refills: 0 | DISCHARGE

## 2022-12-13 RX ORDER — SODIUM CHLORIDE 9 MG/ML
1000 INJECTION INTRAMUSCULAR; INTRAVENOUS; SUBCUTANEOUS
Refills: 0 | Status: COMPLETED | OUTPATIENT
Start: 2022-12-13 | End: 2022-12-13

## 2022-12-13 RX ORDER — SODIUM CHLORIDE 9 MG/ML
250 INJECTION INTRAMUSCULAR; INTRAVENOUS; SUBCUTANEOUS ONCE
Refills: 0 | Status: COMPLETED | OUTPATIENT
Start: 2022-12-13 | End: 2022-12-13

## 2022-12-13 RX ORDER — METOPROLOL TARTRATE 50 MG
1 TABLET ORAL
Qty: 0 | Refills: 0 | DISCHARGE

## 2022-12-13 RX ORDER — LOVASTATIN 20 MG
1 TABLET ORAL
Qty: 0 | Refills: 0 | DISCHARGE

## 2022-12-13 RX ORDER — POTASSIUM CHLORIDE 20 MEQ
40 PACKET (EA) ORAL ONCE
Refills: 0 | Status: COMPLETED | OUTPATIENT
Start: 2022-12-13 | End: 2022-12-13

## 2022-12-13 RX ORDER — AMLODIPINE BESYLATE 2.5 MG/1
1 TABLET ORAL
Qty: 0 | Refills: 0 | DISCHARGE

## 2022-12-13 RX ADMIN — Medication 40 MILLIEQUIVALENT(S): at 10:25

## 2022-12-13 RX ADMIN — SODIUM CHLORIDE 75 MILLILITER(S): 9 INJECTION INTRAMUSCULAR; INTRAVENOUS; SUBCUTANEOUS at 09:55

## 2022-12-13 RX ADMIN — SODIUM CHLORIDE 750 MILLILITER(S): 9 INJECTION INTRAMUSCULAR; INTRAVENOUS; SUBCUTANEOUS at 09:25

## 2022-12-13 RX ADMIN — SODIUM CHLORIDE 75 MILLILITER(S): 9 INJECTION INTRAMUSCULAR; INTRAVENOUS; SUBCUTANEOUS at 12:08

## 2022-12-13 NOTE — ASU DISCHARGE PLAN (ADULT/PEDIATRIC) - NS MD DC FALL RISK RISK
For information on Fall & Injury Prevention, visit: [email or web address] OR  [email or web address] OR  https://www.cdc.gov/steadi/patient.html

## 2022-12-13 NOTE — H&P CARDIOLOGY - HISTORY OF PRESENT ILLNESS
This is a 76y/o AA male with no known implantable devices noted COVID + from 12/1/22 Vaccinated with Pfizer x 2 doses with PMHX of HTN, HLD, lung cancer s/p RUL VATS, Bladder cancer s/p Ileostomy and former smoker . Pt had recent +stress test showed reversible perfusion defects, moderate to severe inferior lateral  This is a 76y/o AA male with no known implantable devices noted COVID + from 12/1/22 Vaccinated with Pfizer x 2 doses with PMHX of HTN, HLD, lung cancer s/p RUL VATS, Bladder cancer s/p Ileostomy and former smoker . Pt had recent +stress test 11/8/22 showed reversible perfusion defects, moderate to severe inferior lateral RCA/LCX Hypokinetic Lateral wall LVEF 61% . Now presents for [facility] today with Dr. Anton. Currently CP free no sob no palpitations no lightheadedness or dizziness noted.   < from: NM Pharm Stress Test, Single Isotope (12.04.13 @ 13:28) >  FINDINGS: The technical quality of the resting and post-stress perfusion   images was satisfactory.  Review of resting and post-stress myocardial   scintigrams revealed normal left ventricular perfusion. There was no   evidence of reversible or fixed perfusion defects.    Gated wall motion study revealed normal left ventricular contractility.   There were no regional wall motion abnormalities or regions of decreased   wall thickening. There was no paradoxical septal motion.     Calculated leftventricular ejection fraction post-stress was 74%, based   on a left ventricular end diastolic volume of 87 mL and an end systolic   volume of 23 mL. Stroke volume was 64 mL.    IMPRESSION: Normal SPECT Myocardial Perfusion Imaging.     Normal left ventricular function with an ejection fraction of 74%   (Normal: 50% or greater).    No regional wall motion abnormalities.    No evidence of reversible or fixed perfusion defects.  SIA ROBLERO M.D., CHIEF OF NUCLEAR MEDICINE    This examination was interpreted on:  Dec  4 2013  5:51P.  This document   has been electronically signed. Dec  4 2013  5:55P.    < end of copied text >

## 2022-12-13 NOTE — ASU DISCHARGE PLAN (ADULT/PEDIATRIC) - ASU DC SPECIAL INSTRUCTIONSFT
Wound Care:   The day AFTER your procedure, please remove the bandage GENTLY, and clean using mild soap and gentle warm water stream, then pat dry. Leave OPEN to air. YOU MAY SHOWER.  DO NOT apply lotions, creams, ointments, powder, parfumes to your incision site  DO NOT SOAK/SUBMERGE your access site for 1 week ( no baths, no pools, no tubs, etc...)  Check  your groin and /or wrist daily. A small amount of bruising and soreness is normal.    ACTIVITY: **for 24 hours**   - DO NOT DRIVE  - DO NOT make any important decisions or sign legal documents   - DO NOT operate heavy machinaries   - you may resume sexual activity in 48 hours, unless otherwise instructed by your cardiologist     If your procedure was done through the WRIST:** for the NEXT 3 DAYS**  - avoid pushing, pulling, with that affected wrist   - avoid repeated movement of that hand and wrist ( eg: typing, hammering)  - DO NOT LIFT anything more than 5 lbs     If your procedure was done through the GROIN: **for the NEXT 5 DAYS**  - Limit climbing the stairs, DO NOT soak in bathtub or pool  - no strenuous activities, no pushing, no pulling, no straining  - Do not lift anything that is 10lbs or heavier     MEDICATION:   take your medications as explained (please see discharge paperwork)   If you received a STENT, you will be taking antiplatelet medications to KEEP YOUR STENT OPEN (eg: Aspirin, Plavix, Brilinta, Effient, etc).  Take as prescribed DO NOT STOP taking them without consulting with your cardiologist first.     Follow a heart healthy diet reccomended by your provider. If you smoke, please STOP SMOKING (You may call [phone] for center of tobacco control if you need assistance)     CALL your doctor/provider to make an appointment in 2 WEEKS     ***CALL YOUR PROVIDER***  if you experience: fever, chills, body aches, or severe pain, swelling, redness, heat or yellow discharge at incision site  If you experience bleeding or excruciating pain at the procedural site, swelling (golf ball size) at your procedural site  If you experience CHEST PAIN  If you experience extremity numbness, tingling, temperature change ( of your procedural site)   If you are unable to reach your doctor, you may contact:   Our Cardiology Office at [facility] at # [phone]   OR dial the number for [facility] # [phone]  OR dial the number for [facility] # [phone]    You have 1 stent placed in the distal circumflex

## 2022-12-13 NOTE — H&P CARDIOLOGY - NSICDXPASTMEDICALHX_GEN_ALL_CORE_FT
PAST MEDICAL HISTORY:  Cancer of bladder     H/O ileostomy     HTN (hypertension)     Hyperlipidemia     Lung cancer

## 2022-12-13 NOTE — ASU DISCHARGE PLAN (ADULT/PEDIATRIC) - CARE PROVIDER_API CALL
Dank Morales  CARDIOLOGY  [address] [facility] [address] [facility], [facility] [address]  [place] [address]  Phone: [phone]  Fax: [phone]  Established Patient  Follow Up Time: 2 weeks

## 2023-04-12 ENCOUNTER — APPOINTMENT (OUTPATIENT)
Dept: PULMONOLOGY | Facility: CLINIC | Age: 78
End: 2023-04-12
Payer: MEDICARE

## 2023-04-12 VITALS
SYSTOLIC BLOOD PRESSURE: 116 MMHG | TEMPERATURE: 96.2 F | DIASTOLIC BLOOD PRESSURE: 72 MMHG | OXYGEN SATURATION: 96 % | BODY MASS INDEX: 22.36 KG/M2 | WEIGHT: 131 LBS | HEART RATE: 64 BPM | HEIGHT: 64 IN

## 2023-04-12 PROBLEM — C34.90 MALIGNANT NEOPLASM OF UNSPECIFIED PART OF UNSPECIFIED BRONCHUS OR LUNG: Chronic | Status: ACTIVE | Noted: 2022-12-13

## 2023-04-12 PROBLEM — Z98.890 OTHER SPECIFIED POSTPROCEDURAL STATES: Chronic | Status: ACTIVE | Noted: 2022-12-13

## 2023-04-12 PROBLEM — I10 ESSENTIAL (PRIMARY) HYPERTENSION: Chronic | Status: ACTIVE | Noted: 2022-12-13

## 2023-04-12 PROBLEM — E78.5 HYPERLIPIDEMIA, UNSPECIFIED: Chronic | Status: ACTIVE | Noted: 2022-12-13

## 2023-04-12 PROBLEM — C67.9 MALIGNANT NEOPLASM OF BLADDER, UNSPECIFIED: Chronic | Status: ACTIVE | Noted: 2022-12-13

## 2023-04-12 PROCEDURE — 94060 EVALUATION OF WHEEZING: CPT

## 2023-04-12 PROCEDURE — 94729 DIFFUSING CAPACITY: CPT

## 2023-04-12 PROCEDURE — 99214 OFFICE O/P EST MOD 30 MIN: CPT | Mod: 25

## 2023-04-12 PROCEDURE — 94727 GAS DIL/WSHOT DETER LNG VOL: CPT

## 2023-04-12 RX ORDER — ASPIRIN ENTERIC COATED TABLETS 81 MG 81 MG/1
81 TABLET, DELAYED RELEASE ORAL
Refills: 0 | Status: ACTIVE | COMMUNITY

## 2023-04-12 RX ORDER — LOVASTATIN 40 MG/1
40 TABLET ORAL
Qty: 30 | Refills: 0 | Status: COMPLETED | COMMUNITY
Start: 2021-10-01 | End: 2023-04-12

## 2023-04-12 RX ORDER — ATORVASTATIN CALCIUM 40 MG/1
40 TABLET, FILM COATED ORAL
Refills: 0 | Status: ACTIVE | COMMUNITY

## 2023-04-12 RX ORDER — AMLODIPINE BESYLATE 10 MG/1
10 TABLET ORAL
Refills: 0 | Status: ACTIVE | COMMUNITY

## 2023-04-12 RX ORDER — CLOPIDOGREL BISULFATE 75 MG/1
75 TABLET, FILM COATED ORAL
Refills: 0 | Status: ACTIVE | COMMUNITY

## 2023-04-12 RX ORDER — DICLOFENAC SODIUM 10 MG/G
1 GEL TOPICAL
Qty: 100 | Refills: 0 | Status: COMPLETED | COMMUNITY
Start: 2018-01-16 | End: 2023-04-12

## 2023-04-12 RX ORDER — AMLODIPINE BESYLATE 5 MG/1
5 TABLET ORAL
Refills: 0 | Status: COMPLETED | COMMUNITY
End: 2023-04-12

## 2023-04-12 RX ORDER — PRAVASTATIN SODIUM 40 MG/1
40 TABLET ORAL
Refills: 0 | Status: COMPLETED | COMMUNITY
End: 2023-04-12

## 2023-04-12 NOTE — PHYSICAL EXAM
[General Appearance - In No Acute Distress] : no acute distress [Neck Appearance] : the appearance of the neck was normal [Neck Cervical Mass (___cm)] : no neck mass was observed [Heart Sounds] : normal S1 and S2 [Auscultation Breath Sounds / Voice Sounds] : lungs were clear to auscultation bilaterally [Surgical scars] : surgical scars [] : no hepato-splenomegaly [Abnormal Walk] : normal gait [Cyanosis, Localized] : no localized cyanosis [Skin Turgor] : normal skin turgor [No Focal Deficits] : no focal deficits [Oriented To Time, Place, And Person] : oriented to person, place, and time [Well Groomed] : well groomed [FreeTextEntry1] : A urine collection bag noted on the abdominal wall.

## 2023-04-12 NOTE — HISTORY OF PRESENT ILLNESS
[Former] : former [TextBox_4] : He is feeling well.  No complaint of cough, wheezing or shortness of breath.  No constitutional symptoms.\par \par Had a stent placed on 12/13/22 at [facility]. Found to have CAD on a pre-op evaluation. \par \par He is going to have surgery for hemorrhoids soon.  [YearQuit] : 1990 [Daytime Somnolence] : denies daytime somnolence [Difficulty Maintaining Sleep] : does not have difficulty maintaining sleep [Fatigue] : no fatigue

## 2023-04-12 NOTE — PROCEDURE
[FreeTextEntry1] : PFT 9/8/17: Spirometry, lung volumes and diffusion were all normal.\par \par PFT 3/819: Spirometry was normal. Lung volumes are normal. Diffusion capacity was normal. Minimal response noted after administration of an inhaled bronchodilator.\par \par PFT 4/12/23: Spirometry, lung volumes and diffusion were within normal limits. Mild improvement after bronchodilator. \par \par CT Chest 11/9/18: Compared to 5/18/18.  A small nodule was noted in the left upper lobe, measuring 2 mm and unchanged when compared to the prior study.  A ground glass nodular opacity also present in the left upper lobe measuring 5 mm. Also unchanged from the prior CT. No new nodules noted. No lymphadenopathy. No pleural effusions.\par \par CT Chest 8/22/19: Post-op changed in the right lung. 5 mm ground glass nodule in the left upper lobe unchanged compared to prior study. \par \par CT Chest 10/2/20: Postoperative changes in the right lung. 5 mm JOHNY nodule was stable. \par \par CT Chest 2/9/21: Stable post-op changes. 5 mm nodule JOHNY stable.No recurrent disease. \par \par CT Chest 7/14/22: No recurrent disease. JOHNY nodule was stable.

## 2023-04-12 NOTE — DISCUSSION/SUMMARY
[FreeTextEntry1] : He is a 77 year-old man with history of hypertension, hyperlipidemia, bladder cancer and ileostomy. S/P VATS RUL lobectomy 4/17/17 for 3.0 cm right upper lobe cavitary mass. 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 Lymph nodes negative. There was a suspicion that the right upper lobe mass was a metastatic bladder cancer. Seen by medical oncology. No chemotherapy was indicated. CT 8/22/19 showed no evidence of any recurrent disease. CT Chest 10/2/20: Postoperative changes in the right lung. 5 mm JOHNY nodule was stable. CT Chest 2/9/21: Stable post-op changes. 5 mm nodule JOHNY stable.No recurrent disease. CT Chest 7/14/22: No recurrent disease. \par \par Impression: \par His pulmonary status is stable\par - stable for his surgery from the pulmonary aspect\par \par Recommend: \par Follow up CT Chest in three months\par Follow up in six months \par

## 2023-04-12 NOTE — REVIEW OF SYSTEMS
[Nasal Congestion] : nasal congestion [Hypertension] : ~T hypertension [Fever] : no fever [Fatigue] : no fatigue [Cough] : no cough [Sputum] : not coughing up ~M sputum [Dyspnea] : no dyspnea [Wheezing] : no wheezing [Chest Discomfort] : no chest discomfort [Edema] : ~T edema was not present [Hives] : no urticaria was observed [Heartburn] : no heartburn [Reflux] : no reflux [Dysphagia] : no dysphagia [Nausea] : no nausea [Nocturia] : no nocturia [Back Pain] : ~T no back pain [Myalgias] : no myalgias [Rash] : no [unfilled] rash [Headache] : no headache [Anxiety] : no anxiety [DVT] : no DVT [Pulmonary Embolism] : no pulmonary embolism

## 2023-10-11 ENCOUNTER — APPOINTMENT (OUTPATIENT)
Dept: PULMONOLOGY | Facility: CLINIC | Age: 78
End: 2023-10-11
Payer: MEDICARE

## 2023-10-11 VITALS
BODY MASS INDEX: 22.66 KG/M2 | WEIGHT: 132 LBS | TEMPERATURE: 97.5 F | SYSTOLIC BLOOD PRESSURE: 130 MMHG | DIASTOLIC BLOOD PRESSURE: 70 MMHG | HEART RATE: 63 BPM | OXYGEN SATURATION: 99 %

## 2023-10-11 DIAGNOSIS — R91.1 SOLITARY PULMONARY NODULE: ICD-10-CM

## 2023-10-11 PROCEDURE — 99213 OFFICE O/P EST LOW 20 MIN: CPT

## 2024-02-19 ENCOUNTER — EMERGENCY (EMERGENCY)
Facility: HOSPITAL | Age: 79
LOS: 1 days | Discharge: HOME | End: 2024-02-19
Attending: EMERGENCY MEDICINE | Admitting: EMERGENCY MEDICINE
Payer: MEDICARE

## 2024-02-19 VITALS
TEMPERATURE: 98 F | DIASTOLIC BLOOD PRESSURE: 85 MMHG | SYSTOLIC BLOOD PRESSURE: 123 MMHG | OXYGEN SATURATION: 100 % | RESPIRATION RATE: 17 BRPM | HEART RATE: 62 BPM

## 2024-02-19 VITALS
RESPIRATION RATE: 15 BRPM | SYSTOLIC BLOOD PRESSURE: 168 MMHG | OXYGEN SATURATION: 100 % | TEMPERATURE: 98 F | HEART RATE: 89 BPM | DIASTOLIC BLOOD PRESSURE: 88 MMHG

## 2024-02-19 LAB
ADD ON TEST-SPECIMEN IN LAB: SIGNIFICANT CHANGE UP
ALBUMIN SERPL ELPH-MCNC: 3.6 G/DL — SIGNIFICANT CHANGE UP (ref 3.3–5)
ALP SERPL-CCNC: 55 U/L — SIGNIFICANT CHANGE UP (ref 40–120)
ALT FLD-CCNC: 16 U/L — SIGNIFICANT CHANGE UP (ref 4–41)
ANION GAP SERPL CALC-SCNC: 12 MMOL/L — SIGNIFICANT CHANGE UP (ref 7–14)
APTT BLD: 31.1 SEC — SIGNIFICANT CHANGE UP (ref 24.5–35.6)
AST SERPL-CCNC: 26 U/L — SIGNIFICANT CHANGE UP (ref 4–40)
BASE EXCESS BLDV CALC-SCNC: 0.2 MMOL/L — SIGNIFICANT CHANGE UP (ref -2–3)
BASOPHILS # BLD AUTO: 0.02 K/UL — SIGNIFICANT CHANGE UP (ref 0–0.2)
BASOPHILS NFR BLD AUTO: 0.3 % — SIGNIFICANT CHANGE UP (ref 0–2)
BILIRUB SERPL-MCNC: 0.6 MG/DL — SIGNIFICANT CHANGE UP (ref 0.2–1.2)
BLOOD GAS VENOUS COMPREHENSIVE RESULT: SIGNIFICANT CHANGE UP
BUN SERPL-MCNC: 17 MG/DL — SIGNIFICANT CHANGE UP (ref 7–23)
CALCIUM SERPL-MCNC: 9.3 MG/DL — SIGNIFICANT CHANGE UP (ref 8.4–10.5)
CHLORIDE BLDV-SCNC: 106 MMOL/L — SIGNIFICANT CHANGE UP (ref 96–108)
CHLORIDE SERPL-SCNC: 99 MMOL/L — SIGNIFICANT CHANGE UP (ref 98–107)
CO2 BLDV-SCNC: 27.4 MMOL/L — HIGH (ref 22–26)
CO2 SERPL-SCNC: 26 MMOL/L — SIGNIFICANT CHANGE UP (ref 22–31)
CREAT SERPL-MCNC: 1.26 MG/DL — SIGNIFICANT CHANGE UP (ref 0.5–1.3)
EGFR: 58 ML/MIN/1.73M2 — LOW
EOSINOPHIL # BLD AUTO: 0.16 K/UL — SIGNIFICANT CHANGE UP (ref 0–0.5)
EOSINOPHIL NFR BLD AUTO: 2.5 % — SIGNIFICANT CHANGE UP (ref 0–6)
FLUAV AG NPH QL: SIGNIFICANT CHANGE UP
FLUBV AG NPH QL: SIGNIFICANT CHANGE UP
GAS PNL BLDV: 134 MMOL/L — LOW (ref 136–145)
GLUCOSE BLDV-MCNC: 88 MG/DL — SIGNIFICANT CHANGE UP (ref 70–99)
GLUCOSE SERPL-MCNC: 106 MG/DL — HIGH (ref 70–99)
HCO3 BLDV-SCNC: 26 MMOL/L — SIGNIFICANT CHANGE UP (ref 22–29)
HCT VFR BLD CALC: 39.4 % — SIGNIFICANT CHANGE UP (ref 39–50)
HCT VFR BLDA CALC: 35 % — LOW (ref 39–51)
HGB BLD CALC-MCNC: 11.5 G/DL — LOW (ref 12.6–17.4)
HGB BLD-MCNC: 13 G/DL — SIGNIFICANT CHANGE UP (ref 13–17)
IANC: 4.34 K/UL — SIGNIFICANT CHANGE UP (ref 1.8–7.4)
IMM GRANULOCYTES NFR BLD AUTO: 0.5 % — SIGNIFICANT CHANGE UP (ref 0–0.9)
INR BLD: 1.01 RATIO — SIGNIFICANT CHANGE UP (ref 0.85–1.18)
LACTATE BLDV-MCNC: 0.9 MMOL/L — SIGNIFICANT CHANGE UP (ref 0.5–2)
LYMPHOCYTES # BLD AUTO: 1.28 K/UL — SIGNIFICANT CHANGE UP (ref 1–3.3)
LYMPHOCYTES # BLD AUTO: 19.8 % — SIGNIFICANT CHANGE UP (ref 13–44)
MCHC RBC-ENTMCNC: 29.6 PG — SIGNIFICANT CHANGE UP (ref 27–34)
MCHC RBC-ENTMCNC: 33 GM/DL — SIGNIFICANT CHANGE UP (ref 32–36)
MCV RBC AUTO: 89.7 FL — SIGNIFICANT CHANGE UP (ref 80–100)
MONOCYTES # BLD AUTO: 0.64 K/UL — SIGNIFICANT CHANGE UP (ref 0–0.9)
MONOCYTES NFR BLD AUTO: 9.9 % — SIGNIFICANT CHANGE UP (ref 2–14)
NEUTROPHILS # BLD AUTO: 4.34 K/UL — SIGNIFICANT CHANGE UP (ref 1.8–7.4)
NEUTROPHILS NFR BLD AUTO: 67 % — SIGNIFICANT CHANGE UP (ref 43–77)
NRBC # BLD: 0 /100 WBCS — SIGNIFICANT CHANGE UP (ref 0–0)
NRBC # FLD: 0 K/UL — SIGNIFICANT CHANGE UP (ref 0–0)
PCO2 BLDV: 46 MMHG — SIGNIFICANT CHANGE UP (ref 42–55)
PH BLDV: 7.36 — SIGNIFICANT CHANGE UP (ref 7.32–7.43)
PLATELET # BLD AUTO: 226 K/UL — SIGNIFICANT CHANGE UP (ref 150–400)
PO2 BLDV: 32 MMHG — SIGNIFICANT CHANGE UP (ref 25–45)
POTASSIUM BLDV-SCNC: 3.3 MMOL/L — LOW (ref 3.5–5.1)
POTASSIUM SERPL-MCNC: 4.6 MMOL/L — SIGNIFICANT CHANGE UP (ref 3.5–5.3)
POTASSIUM SERPL-SCNC: 4.6 MMOL/L — SIGNIFICANT CHANGE UP (ref 3.5–5.3)
PROT SERPL-MCNC: 7.5 G/DL — SIGNIFICANT CHANGE UP (ref 6–8.3)
PROTHROM AB SERPL-ACNC: 11.4 SEC — SIGNIFICANT CHANGE UP (ref 9.5–13)
RBC # BLD: 4.39 M/UL — SIGNIFICANT CHANGE UP (ref 4.2–5.8)
RBC # FLD: 13.4 % — SIGNIFICANT CHANGE UP (ref 10.3–14.5)
RSV RNA NPH QL NAA+NON-PROBE: SIGNIFICANT CHANGE UP
SAO2 % BLDV: 48.3 % — LOW (ref 67–88)
SARS-COV-2 RNA SPEC QL NAA+PROBE: SIGNIFICANT CHANGE UP
SODIUM SERPL-SCNC: 137 MMOL/L — SIGNIFICANT CHANGE UP (ref 135–145)
TROPONIN T, HIGH SENSITIVITY RESULT: 12 NG/L — SIGNIFICANT CHANGE UP
WBC # BLD: 6.47 K/UL — SIGNIFICANT CHANGE UP (ref 3.8–10.5)
WBC # FLD AUTO: 6.47 K/UL — SIGNIFICANT CHANGE UP (ref 3.8–10.5)

## 2024-02-19 PROCEDURE — 93010 ELECTROCARDIOGRAM REPORT: CPT

## 2024-02-19 PROCEDURE — 71275 CT ANGIOGRAPHY CHEST: CPT | Mod: 26,MA

## 2024-02-19 PROCEDURE — 99285 EMERGENCY DEPT VISIT HI MDM: CPT

## 2024-02-19 RX ORDER — AZITHROMYCIN 500 MG/1
1 TABLET, FILM COATED ORAL
Qty: 4 | Refills: 0
Start: 2024-02-19 | End: 2024-02-22

## 2024-02-19 RX ORDER — CEFTRIAXONE 500 MG/1
1000 INJECTION, POWDER, FOR SOLUTION INTRAMUSCULAR; INTRAVENOUS ONCE
Refills: 0 | Status: COMPLETED | OUTPATIENT
Start: 2024-02-19 | End: 2024-02-19

## 2024-02-19 RX ORDER — AZITHROMYCIN 500 MG/1
500 TABLET, FILM COATED ORAL ONCE
Refills: 0 | Status: COMPLETED | OUTPATIENT
Start: 2024-02-19 | End: 2024-02-19

## 2024-02-19 RX ADMIN — AZITHROMYCIN 255 MILLIGRAM(S): 500 TABLET, FILM COATED ORAL at 08:33

## 2024-02-19 RX ADMIN — CEFTRIAXONE 100 MILLIGRAM(S): 500 INJECTION, POWDER, FOR SOLUTION INTRAMUSCULAR; INTRAVENOUS at 07:48

## 2024-02-19 NOTE — ED PROVIDER NOTE - PATIENT PORTAL LINK FT
You can access the FollowMyHealth Patient Portal offered by [facility] by registering at the following website: [email or web address]. By joining [employer]’s FollowMyHealth portal, you will also be able to view your health information using other applications (apps) compatible with our system.

## 2024-02-19 NOTE — ED ADULT NURSE NOTE - CHIEF COMPLAINT QUOTE
C/O coughing up blood. on Plavix. No complaints of chest pain, headache, nausea, dizziness, vomiting  SOB, fever, chills verbalized. phx lung and bladder CA. HTN

## 2024-02-19 NOTE — ED PROVIDER NOTE - NSICDXPASTMEDICALHX_GEN_ALL_CORE_FT
PAST MEDICAL HISTORY:  Bladder cancer     CAD (coronary artery disease)     Hypertension     Lung cancer

## 2024-02-19 NOTE — ED PROVIDER NOTE - PHYSICAL EXAMINATION
GENERAL: no acute distress, non-toxic appearing  HEAD: normocephalic, atraumatic  HEENT: normal conjunctiva, oral mucosa moist, neck supple  CARDIAC: regular rate and rhythm, normal S1 and S2,  no appreciable murmurs  PULM: clear to ascultation bilaterally, no crackles, rales, rhonchi, or wheezing  GI: abdomen nondistended, soft, nontender, no guarding or rebound tenderness  : +nephrostomy bag with clear yellow urine   NEURO: alert and oriented x 3, normal speech, moving all extremities   MSK: no visible deformities, no peripheral edema, calf tenderness/redness/swelling  SKIN: no visible rashes, dry, well-perfused  PSYCH: appropriate mood and affect

## 2024-02-19 NOTE — ED ADULT TRIAGE NOTE - CHIEF COMPLAINT QUOTE
C/O coughing up blood. No complaints of chest pain, headache, nausea, dizziness, vomiting  SOB, fever, chills verbalized. phx lung and bladder CA. HTN C/O coughing up blood. on Plavix. No complaints of chest pain, headache, nausea, dizziness, vomiting  SOB, fever, chills verbalized. phx lung and bladder CA. HTN

## 2024-02-19 NOTE — ED PROVIDER NOTE - NSFOLLOWUPINSTRUCTIONS_ED_ALL_ED_FT
Reason for ED Visit:  - Coughing blood for 1 week    Findings/Diagnosis:  - Lung infection (pneumonia)   - 1.6 x 1 cm right hilar lymph node    Please return to the ED immediately for any new, worsening, or concerning symptoms including, but not limited to:   - Persistent coughing up blood   - Fevers   - Chest pain   - Difficulty breathing    Please take the following medications at home:   - Augmentin (amoxicillin 875 mg–clavulanate 125 mg) 1 tablet twice daily for 4 days   - Azithromycin 250 mg daily for 4 days   - Acetaminophen 500-1000 mg every 6 hours as needed for fever/pain    Please follow up with your primary care provider regarding this ED visit.  A follow-up CT is recommended in 3 months to assess for resolution of symptoms and changes in incidental lung findings.    Thank you for choosing us for your care.

## 2024-02-19 NOTE — ED ADULT NURSE NOTE - OBJECTIVE STATEMENT
Pt received to room 8. Pt A&Ox4, ambulatory at baseline. Pt c/o hemoptysis x1 week. Pt also states that when he blows his nose blood is present. Hx of lung cancer, bladder cancer Pt received to room 8. Pt A&Ox4, ambulatory at baseline. Pt c/o hemoptysis x1 week. Pt also states that when he blows his nose blood is present. Hx of lung cancer, bladder cancer, stents on plavix and aspirin. Endorses 4lbs of weight loss in past week. Pt states he was experiencing hemoptysis when he was diagnosed with lung ca in 2017. RR even and unlabored, NAD noted. Pt satting 100% on RA. IV access established with 20G to L AC, labs collected and sent as per MD orders. Safety measures in place, pt stable upon exiting room Pt received to room 8. Pt A&Ox4, ambulatory at baseline. Pt c/o hemoptysis x1 week. Pt also states that when he blows his nose blood is present. Hx of lung cancer, bladder cancer, stents on plavix and aspirin. Endorses 4lbs of weight loss in past week. Pt states he was experiencing hemoptysis when he was diagnosed with lung ca in 2017.  Pt with R urostomy, stoma beefy and red, yellow urine noted in bag. RR even and unlabored, NAD noted. Pt satting 100% on RA. IV access established with 20G to L AC, labs collected and sent as per MD orders. Safety measures in place, pt stable upon exiting room

## 2024-02-19 NOTE — ED PROVIDER NOTE - OBJECTIVE STATEMENT
77 yo M PMHx of lung cancer s/p resection, bladder cancer (pt has nephrostomy tubes), CAD s/p stent on ASA and plavix, HTN presents with hemoptysis x 1 week. Patient states he has had spotting bright red blood when he coughs on tissue and also when he blows his nose. He has occasional SOB and chest pain on the right side of his chest. No leg swelling, fevers. He has had weight loss of 4 lbs in the last week, no night sweats. No recent travel.

## 2024-02-19 NOTE — ED PROVIDER NOTE - ATTENDING CONTRIBUTION TO CARE
79 y/o M with h/o lung cancer s/p resection, bladder cancer with nephrostomy tubes, CAD s/p stent on ASA and plavix, HTN here with hemoptysis.  Pt reports for the past week he has been having occasional epistaxis, will blow his nose and have  blood clot come out.  Afterwards he will then notes small specks of blood in his sputum when he coughs.  Pt also reports chest discomfort, which has been chronic for years, and SOB at nighttime.  No fever, chills, n/v, abd pain, leg pain or swelling.      Well appearing, sitting comfortably in stretcher, awake and alert, nontoxic.  AF/VSS.  Pt is speaking full sentences, no active epistaxis or coughing.  Lungs cta bl.  Cards nl S1/S2, RRR, no MRG.  Abd soft ntnd.  No pedal edema or calf tenderness.    Suspect hemoptysis is really related to episodes of epistaxis, but given cancer hx will need to r/o pe.  Plan for labs, ekg, cta chest, reassess.

## 2024-02-19 NOTE — ED PROVIDER NOTE - CLINICAL SUMMARY MEDICAL DECISION MAKING FREE TEXT BOX
77 yo M hx of lung, bladder ca with hemoptysis, HDS, nonfocal exam. Will obtain CTA to assess for PE, new masses. Labs, imaging and reassess.

## 2024-04-11 ENCOUNTER — APPOINTMENT (OUTPATIENT)
Dept: PULMONOLOGY | Facility: CLINIC | Age: 79
End: 2024-04-11
Payer: MEDICARE

## 2024-04-11 VITALS
SYSTOLIC BLOOD PRESSURE: 136 MMHG | TEMPERATURE: 96 F | DIASTOLIC BLOOD PRESSURE: 84 MMHG | BODY MASS INDEX: 23.86 KG/M2 | OXYGEN SATURATION: 99 % | WEIGHT: 139 LBS | HEART RATE: 77 BPM

## 2024-04-11 DIAGNOSIS — R93.89 ABNORMAL FINDINGS ON DIAGNOSTIC IMAGING OF OTHER SPECIFIED BODY STRUCTURES: ICD-10-CM

## 2024-04-11 PROBLEM — C34.90 MALIGNANT NEOPLASM OF UNSPECIFIED PART OF UNSPECIFIED BRONCHUS OR LUNG: Chronic | Status: ACTIVE | Noted: 2024-02-19

## 2024-04-11 PROBLEM — I10 ESSENTIAL (PRIMARY) HYPERTENSION: Chronic | Status: ACTIVE | Noted: 2024-02-19

## 2024-04-11 PROBLEM — I25.10 ATHEROSCLEROTIC HEART DISEASE OF NATIVE CORONARY ARTERY WITHOUT ANGINA PECTORIS: Chronic | Status: ACTIVE | Noted: 2024-02-19

## 2024-04-11 PROBLEM — C67.9 MALIGNANT NEOPLASM OF BLADDER, UNSPECIFIED: Chronic | Status: ACTIVE | Noted: 2024-02-19

## 2024-04-11 PROCEDURE — 99214 OFFICE O/P EST MOD 30 MIN: CPT

## 2024-04-11 NOTE — PROCEDURE
[FreeTextEntry1] : PFT 9/8/17: Spirometry, lung volumes and diffusion were all normal.  PFT 3/819: Spirometry was normal. Lung volumes are normal. Diffusion capacity was normal. Minimal response noted after administration of an inhaled bronchodilator.  PFT 4/12/23: Spirometry, lung volumes and diffusion were within normal limits. Mild improvement after bronchodilator.   CT Chest 11/9/18: Compared to 5/18/18.  A small nodule was noted in the left upper lobe, measuring 2 mm and unchanged when compared to the prior study.  A ground glass nodular opacity also present in the left upper lobe measuring 5 mm. Also unchanged from the prior CT. No new nodules noted. No lymphadenopathy. No pleural effusions.  CT Chest 8/22/19: Post-op changed in the right lung. 5 mm ground glass nodule in the left upper lobe unchanged compared to prior study.   CT Chest 10/2/20: Postoperative changes in the right lung. 5 mm JOHNY nodule was stable.   CT Chest 2/9/21: Stable post-op changes. 5 mm nodule JOHNY stable.No recurrent disease.   CT Chest 7/14/22: No recurrent disease. JOHNY nodule was stable.   CT Chest 9/2/23: No metastatic disease.   CT Chest 2/19/24: Groundglass opacity noted in the right lower lobe.  Right hilar adenopathy, possibly reactive.

## 2024-04-11 NOTE — HISTORY OF PRESENT ILLNESS
[Former] : former [Never] : never [TextBox_4] : The patient came for a scheduled follow up visit today.   He was evaluated in the emergency room in February.  He was told that he had pneumonia.  He was given antibiotics.  At the present time he is asymptomatic. [YearQuit] : 1990 [Awakes Unrefreshed] : does not awaken unrefreshed [Daytime Somnolence] : denies daytime somnolence [Difficulty Maintaining Sleep] : does not have difficulty maintaining sleep [Fatigue] : no fatigue

## 2024-04-11 NOTE — REVIEW OF SYSTEMS
[Nasal Congestion] : nasal congestion [Hypertension] : ~T hypertension [Fatigue] : no fatigue [Cough] : no cough [Sputum] : not coughing up ~M sputum [Hemoptysis] : no hemoptysis [Dyspnea] : no dyspnea [Chest Tightness] : no chest tightness [Wheezing] : no wheezing [Chest Discomfort] : no chest discomfort [Palpitations] : no palpitations [Edema] : ~T edema was not present [Hives] : no urticaria was observed [Heartburn] : no heartburn [Reflux] : no reflux [Dysphagia] : no dysphagia [Nausea] : no nausea [Nocturia] : no nocturia [Back Pain] : ~T no back pain [Myalgias] : no myalgias [Rash] : no [unfilled] rash [Headache] : no headache [Anxiety] : no anxiety [DVT] : no DVT [Pulmonary Embolism] : no pulmonary embolism

## 2024-04-11 NOTE — DISCUSSION/SUMMARY
[FreeTextEntry1] : He is a 78 year-old man with history of hypertension, hyperlipidemia, bladder cancer and ileostomy. S/P VATS RUL lobectomy 4/17/17 for 3.0 cm right upper lobe cavitary mass. 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 Lymph nodes negative. There was a suspicion that the right upper lobe mass was a metastatic bladder cancer. Seen by medical oncology. No chemotherapy was indicated. CT 8/22/19 showed no evidence of any recurrent disease. CT Chest 10/2/20: Postoperative changes in the right lung. 5 mm JOHNY nodule was stable. CT Chest 2/9/21: Stable post-op changes. 5 mm nodule JOHNY stable. No recurrent disease. CT Chest 7/14/22: No recurrent disease. CT Chest 9/2/23: No metastatic disease.   Impression He was hospitalized in February 2024 for pneumonia -CT scan was done, results as above.  See report. His pulmonary status is stable  CT without any mets H/O CAD s/p stents  Recommend Follow-up CT in 6 months Follow up in 6 months - sooner if necessary   Hospital images reviewed time spent 35 minutes.

## 2024-04-11 NOTE — PHYSICAL EXAM
[Well Groomed] : well groomed [General Appearance - In No Acute Distress] : no acute distress [Neck Cervical Mass (___cm)] : no neck mass was observed [Heart Rate And Rhythm] : heart rate and rhythm were normal [Heart Sounds] : normal S1 and S2 [Auscultation Breath Sounds / Voice Sounds] : lungs were clear to auscultation bilaterally [Surgical scars] : surgical scars [] : no hepato-splenomegaly [Abnormal Walk] : normal gait [Nail Clubbing] : no clubbing of the fingernails [Cyanosis, Localized] : no localized cyanosis [Skin Turgor] : normal skin turgor [No Focal Deficits] : no focal deficits [Oriented To Time, Place, And Person] : oriented to person, place, and time [Impaired Insight] : insight and judgment were intact [FreeTextEntry1] : A urine collection bag noted on the abdominal wall.

## 2024-08-19 ENCOUNTER — APPOINTMENT (OUTPATIENT)
Dept: CT IMAGING | Facility: CLINIC | Age: 79
End: 2024-08-19
Payer: MEDICARE

## 2024-08-19 PROCEDURE — 71250 CT THORAX DX C-: CPT

## 2024-08-29 ENCOUNTER — APPOINTMENT (OUTPATIENT)
Dept: PULMONOLOGY | Facility: CLINIC | Age: 79
End: 2024-08-29
Payer: MEDICARE

## 2024-08-29 VITALS
TEMPERATURE: 97.3 F | DIASTOLIC BLOOD PRESSURE: 72 MMHG | BODY MASS INDEX: 22.66 KG/M2 | OXYGEN SATURATION: 99 % | WEIGHT: 132 LBS | SYSTOLIC BLOOD PRESSURE: 130 MMHG | HEART RATE: 59 BPM

## 2024-08-29 DIAGNOSIS — R91.1 SOLITARY PULMONARY NODULE: ICD-10-CM

## 2024-08-29 DIAGNOSIS — R93.89 ABNORMAL FINDINGS ON DIAGNOSTIC IMAGING OF OTHER SPECIFIED BODY STRUCTURES: ICD-10-CM

## 2024-08-29 PROCEDURE — 99214 OFFICE O/P EST MOD 30 MIN: CPT

## 2024-08-29 NOTE — PHYSICAL EXAM
[Well Groomed] : well groomed [General Appearance - In No Acute Distress] : no acute distress [Neck Cervical Mass (___cm)] : no neck mass was observed [Heart Rate And Rhythm] : heart rate and rhythm were normal [Auscultation Breath Sounds / Voice Sounds] : lungs were clear to auscultation bilaterally [Heart Sounds] : normal S1 and S2 [Surgical scars] : surgical scars [Abnormal Walk] : normal gait [Nail Clubbing] : no clubbing of the fingernails [Cyanosis, Localized] : no localized cyanosis [Skin Turgor] : normal skin turgor [No Focal Deficits] : no focal deficits [Oriented To Time, Place, And Person] : oriented to person, place, and time [Normal Appearance] : normal appearance [] : no respiratory distress [Abdomen Tenderness] : non-tender [FreeTextEntry1] : urine collection bag noted on the abdominal wall.

## 2024-08-29 NOTE — DISCUSSION/SUMMARY
[FreeTextEntry1] : He is a 79 year-old man with history of hypertension, hyperlipidemia, bladder cancer and ileostomy. S/P VATS RUL lobectomy 4/17/17 for 3.0 cm right upper lobe cavitary mass. 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 Lymph nodes negative. There was a suspicion that the right upper lobe mass was a metastatic bladder cancer. Seen by medical oncology. No chemotherapy was indicated.  Impression Hospitalized in February 2024 for pneumonia -CT scan of 8/19/2024 showed improvement, see report His pulmonary status is stable  H/O CAD s/p stents  Recommend Follow-up with urology I will see him again in 6 months, sooner if needed   Time spent with the patient, reviewing data and performing documentation was 35 minutes.

## 2024-08-29 NOTE — PROCEDURE
[FreeTextEntry1] : PFT 9/8/17: Spirometry, lung volumes and diffusion were all normal.  PFT 3/819: Spirometry was normal. Lung volumes are normal. Diffusion capacity was normal. Minimal response noted after administration of an inhaled bronchodilator.  PFT 4/12/23: Spirometry, lung volumes and diffusion were within normal limits. Mild improvement after bronchodilator.   CT Chest 11/9/18: Compared to 5/18/18.  A small nodule was noted in the left upper lobe, measuring 2 mm and unchanged when compared to the prior study.  A ground glass nodular opacity also present in the left upper lobe measuring 5 mm. Also unchanged from the prior CT. No new nodules noted. No lymphadenopathy. No pleural effusions.  CT Chest 8/22/19: Post-op changed in the right lung. 5 mm ground glass nodule in the left upper lobe unchanged compared to prior study.   CT Chest 10/2/20: Postoperative changes in the right lung. 5 mm JOHNY nodule was stable.   CT Chest 2/9/21: Stable post-op changes. 5 mm nodule JOHNY stable.No recurrent disease.   CT Chest 7/14/22: No recurrent disease. JOHNY nodule was stable.   CT Chest 9/2/23: No metastatic disease.   CT Chest 2/19/24: Ground glass opacity noted in the right lower lobe.  Right hilar adenopathy, possibly reactive.  CT Chest 8/23/24: Resolution of the previously noted bilateral lower lobe infiltrates.  Bilateral pulmonary nodules, unchanged.  Status post right upper lobe lobectomy.

## 2024-08-29 NOTE — HISTORY OF PRESENT ILLNESS
[Former] : former [Never] : never [TextBox_4] : The patient came for a scheduled follow up visit today.   He is feeling well.  He denies any cough, wheezing or shortness of breath.  He is not using any inhalers.  He came to discuss the CT findings. [YearQuit] : 1990 [Awakes Unrefreshed] : does not awaken unrefreshed [Daytime Somnolence] : denies daytime somnolence [Difficulty Maintaining Sleep] : does not have difficulty maintaining sleep [Fatigue] : no fatigue

## 2024-08-29 NOTE — REVIEW OF SYSTEMS
[Hypertension] : ~T hypertension [Fever] : no fever [Fatigue] : no fatigue [Postnasal Drip] : no postnasal drip [Cough] : no cough [Hemoptysis] : no hemoptysis [Dyspnea] : no dyspnea [Wheezing] : no wheezing [Chest Discomfort] : no chest discomfort [Edema] : ~T edema was not present [Hives] : no urticaria was observed [Heartburn] : no heartburn [Reflux] : no reflux [Nocturia] : no nocturia [Back Pain] : ~T no back pain [Myalgias] : no myalgias [Rash] : no [unfilled] rash [Headache] : no headache [Anxiety] : no anxiety [DVT] : no DVT [Pulmonary Embolism] : no pulmonary embolism

## 2024-11-04 NOTE — REASON FOR VISIT
Eat healthy foods you enjoy. Rivaroxaban/Xarelto DOES NOT have a special diet. Limit your alcohol intake. [Follow-Up] : a follow-up visit [Lung Cancer] : lung cancer [Abnormal CT Scan] : abnormal CT Scan

## 2025-02-26 ENCOUNTER — APPOINTMENT (OUTPATIENT)
Dept: PULMONOLOGY | Facility: CLINIC | Age: 80
End: 2025-02-26
Payer: MEDICARE

## 2025-02-26 ENCOUNTER — NON-APPOINTMENT (OUTPATIENT)
Age: 80
End: 2025-02-26

## 2025-02-26 VITALS
WEIGHT: 135 LBS | HEART RATE: 79 BPM | DIASTOLIC BLOOD PRESSURE: 78 MMHG | BODY MASS INDEX: 23.17 KG/M2 | TEMPERATURE: 97.3 F | OXYGEN SATURATION: 99 % | SYSTOLIC BLOOD PRESSURE: 126 MMHG

## 2025-02-26 DIAGNOSIS — R91.1 SOLITARY PULMONARY NODULE: ICD-10-CM

## 2025-02-26 DIAGNOSIS — R93.89 ABNORMAL FINDINGS ON DIAGNOSTIC IMAGING OF OTHER SPECIFIED BODY STRUCTURES: ICD-10-CM

## 2025-02-26 DIAGNOSIS — J98.4 OTHER DISORDERS OF LUNG: ICD-10-CM

## 2025-02-26 PROCEDURE — 99214 OFFICE O/P EST MOD 30 MIN: CPT

## 2025-08-27 ENCOUNTER — APPOINTMENT (OUTPATIENT)
Dept: PULMONOLOGY | Facility: CLINIC | Age: 80
End: 2025-08-27
Payer: MEDICARE

## 2025-08-27 VITALS
SYSTOLIC BLOOD PRESSURE: 128 MMHG | OXYGEN SATURATION: 98 % | BODY MASS INDEX: 23.17 KG/M2 | DIASTOLIC BLOOD PRESSURE: 76 MMHG | WEIGHT: 135 LBS | HEART RATE: 78 BPM | TEMPERATURE: 97.7 F

## 2025-08-27 DIAGNOSIS — R93.89 ABNORMAL FINDINGS ON DIAGNOSTIC IMAGING OF OTHER SPECIFIED BODY STRUCTURES: ICD-10-CM

## 2025-08-27 DIAGNOSIS — J98.4 OTHER DISORDERS OF LUNG: ICD-10-CM

## 2025-08-27 DIAGNOSIS — R91.1 SOLITARY PULMONARY NODULE: ICD-10-CM

## 2025-08-27 DIAGNOSIS — Z87.891 PERSONAL HISTORY OF NICOTINE DEPENDENCE: ICD-10-CM

## 2025-08-27 PROCEDURE — 99214 OFFICE O/P EST MOD 30 MIN: CPT
